# Patient Record
Sex: FEMALE | URBAN - METROPOLITAN AREA
[De-identification: names, ages, dates, MRNs, and addresses within clinical notes are randomized per-mention and may not be internally consistent; named-entity substitution may affect disease eponyms.]

---

## 2024-06-28 ENCOUNTER — LAB REQUISITION (OUTPATIENT)
Dept: LAB | Facility: HOSPITAL | Age: 19
End: 2024-06-28

## 2024-06-28 DIAGNOSIS — N39.0 URINARY TRACT INFECTION, SITE NOT SPECIFIED: ICD-10-CM

## 2024-06-28 PROCEDURE — 87086 URINE CULTURE/COLONY COUNT: CPT

## 2024-06-30 LAB — BACTERIA UR CULT: ABNORMAL

## 2024-09-12 ENCOUNTER — APPOINTMENT (OUTPATIENT)
Dept: URGENT CARE | Age: 19
End: 2024-09-12

## 2024-12-01 ENCOUNTER — OFFICE VISIT (OUTPATIENT)
Dept: URGENT CARE | Age: 19
End: 2024-12-01
Payer: MEDICAID

## 2024-12-01 VITALS
SYSTOLIC BLOOD PRESSURE: 137 MMHG | HEART RATE: 71 BPM | RESPIRATION RATE: 18 BRPM | OXYGEN SATURATION: 98 % | DIASTOLIC BLOOD PRESSURE: 80 MMHG

## 2024-12-01 DIAGNOSIS — R35.0 URINARY FREQUENCY: Primary | ICD-10-CM

## 2024-12-01 LAB
POC APPEARANCE, URINE: ABNORMAL
POC BILIRUBIN, URINE: NEGATIVE
POC BLOOD, URINE: NEGATIVE
POC COLOR, URINE: YELLOW
POC GLUCOSE, URINE: NEGATIVE MG/DL
POC KETONES, URINE: NEGATIVE MG/DL
POC LEUKOCYTES, URINE: NEGATIVE
POC NITRITE,URINE: NEGATIVE
POC PH, URINE: 6 PH
POC PROTEIN, URINE: NEGATIVE MG/DL
POC SPECIFIC GRAVITY, URINE: 1.01
POC UROBILINOGEN, URINE: 0.2 EU/DL
PREGNANCY TEST URINE, POC: NEGATIVE

## 2024-12-01 PROCEDURE — 87077 CULTURE AEROBIC IDENTIFY: CPT

## 2024-12-01 PROCEDURE — 87086 URINE CULTURE/COLONY COUNT: CPT

## 2024-12-01 ASSESSMENT — ENCOUNTER SYMPTOMS
CONSTITUTIONAL NEGATIVE: 1
FREQUENCY: 1
DYSURIA: 1

## 2024-12-01 NOTE — PROGRESS NOTES
Subjective   Patient ID: Dhaval Cotto is a 19 y.o. female. They present today with a chief complaint of burning when urinating and Urinary Frequency (X 1 day).    History of Present Illness  19-year-old female presents to clinic today with complaints of dysuria and frequency.  Patient states that this been ongoing for 1 day.  She denies any abdominal or pelvic pain.  Denies any flank pain.  Denies fevers.  Denies body aches or chills.  Denies vaginal discharge.      Urinary Frequency      Past Medical History  Allergies as of 12/01/2024    (No Known Allergies)       (Not in a hospital admission)       No past medical history on file.    No past surgical history on file.         Review of Systems  Review of Systems   Constitutional: Negative.    Genitourinary:  Positive for dysuria and frequency. Negative for pelvic pain.                                  Objective    Vitals:    12/01/24 1734   BP: 137/80   Pulse: 71   Resp: 18   SpO2: 98%     No LMP recorded.    Physical Exam  Constitutional:       Appearance: Normal appearance.   Abdominal:      General: Abdomen is flat.      Palpations: Abdomen is soft.      Tenderness: There is no right CVA tenderness or left CVA tenderness.   Neurological:      Mental Status: She is alert.         Procedures    Point of Care Test & Imaging Results from this visit  Results for orders placed or performed in visit on 12/01/24   POCT pregnancy, urine manually resulted   Result Value Ref Range    Preg Test, Ur Negative Negative   POCT UA Automated manually resulted   Result Value Ref Range    POC Color, Urine Yellow Straw, Yellow, Light-Yellow    POC Appearance, Urine Cloudy (A) Clear    POC Glucose, Urine NEGATIVE NEGATIVE mg/dl    POC Bilirubin, Urine NEGATIVE NEGATIVE    POC Ketones, Urine NEGATIVE NEGATIVE mg/dl    POC Specific Gravity, Urine 1.010 1.005 - 1.035    POC Blood, Urine NEGATIVE NEGATIVE    POC PH, Urine 6.0 No Reference Range Established PH    POC Protein, Urine  NEGATIVE NEGATIVE, 30 (1+) mg/dl    POC Urobilinogen, Urine 0.2 0.2, 1.0 EU/DL    Poc Nitrite, Urine NEGATIVE NEGATIVE    POC Leukocytes, Urine NEGATIVE NEGATIVE      No results found.    Diagnostic study results (if any) were reviewed by Irvin Meade PA-C.    Assessment/Plan   Allergies, medications, history, and pertinent labs/EKGs/Imaging reviewed by Irvin Meade PA-C.     Medical Decision Making  Patient pleasant and cooperative on examination.    Urinalysis did not show signs of an acute UTI.  There is no CVA tenderness.    We will still send urine for culture.  We will contact patient with any abnormal results.    Patient advised to increase fluid intake.  If symptoms persist please follow-up with urology or primary care.    Patient alert and oriented, no acute distress upon discharge.    Orders and Diagnoses  Diagnoses and all orders for this visit:  Urinary frequency  -     POCT pregnancy, urine manually resulted  -     POCT UA Automated manually resulted  -     Urine Culture      Medical Admin Record      Patient disposition: Home    Electronically signed by Irvin Meade PA-C  6:24 PM

## 2024-12-03 LAB — BACTERIA UR CULT: ABNORMAL

## 2024-12-03 RX ORDER — NITROFURANTOIN 25; 75 MG/1; MG/1
100 CAPSULE ORAL 2 TIMES DAILY
Qty: 14 CAPSULE | Refills: 0 | Status: SHIPPED | OUTPATIENT
Start: 2024-12-03 | End: 2024-12-10

## 2025-02-23 ENCOUNTER — OFFICE VISIT (OUTPATIENT)
Dept: URGENT CARE | Age: 20
End: 2025-02-23
Payer: MEDICAID

## 2025-02-23 VITALS
TEMPERATURE: 102 F | HEART RATE: 117 BPM | SYSTOLIC BLOOD PRESSURE: 136 MMHG | RESPIRATION RATE: 18 BRPM | DIASTOLIC BLOOD PRESSURE: 75 MMHG | OXYGEN SATURATION: 97 %

## 2025-02-23 DIAGNOSIS — Z20.822 SUSPECTED COVID-19 VIRUS INFECTION: ICD-10-CM

## 2025-02-23 DIAGNOSIS — R05.1 ACUTE COUGH: ICD-10-CM

## 2025-02-23 DIAGNOSIS — R68.89 FLU-LIKE SYMPTOMS: ICD-10-CM

## 2025-02-23 DIAGNOSIS — R09.81 NASAL CONGESTION: ICD-10-CM

## 2025-02-23 DIAGNOSIS — R50.9 FEVER, UNSPECIFIED FEVER CAUSE: Primary | ICD-10-CM

## 2025-02-23 LAB
POC RAPID INFLUENZA A: NEGATIVE
POC RAPID INFLUENZA B: NEGATIVE
POC SARS-COV-2 AG BINAX: NORMAL

## 2025-02-23 PROCEDURE — 87811 SARS-COV-2 COVID19 W/OPTIC: CPT | Performed by: NURSE PRACTITIONER

## 2025-02-23 PROCEDURE — 99214 OFFICE O/P EST MOD 30 MIN: CPT | Performed by: NURSE PRACTITIONER

## 2025-02-23 PROCEDURE — 87804 INFLUENZA ASSAY W/OPTIC: CPT | Performed by: NURSE PRACTITIONER

## 2025-02-23 RX ORDER — OSELTAMIVIR PHOSPHATE 75 MG/1
75 CAPSULE ORAL EVERY 12 HOURS
Qty: 10 CAPSULE | Refills: 0 | Status: SHIPPED | OUTPATIENT
Start: 2025-02-23 | End: 2025-02-28

## 2025-02-23 RX ORDER — ACETAMINOPHEN 500 MG
1000 TABLET ORAL ONCE
Status: COMPLETED | OUTPATIENT
Start: 2025-02-23 | End: 2025-02-23

## 2025-02-23 RX ADMIN — Medication 1000 MG: at 14:34

## 2025-02-23 ASSESSMENT — ENCOUNTER SYMPTOMS
HEMOPTYSIS: 0
CHILLS: 0
HEADACHES: 0
COUGH: 1
SORE THROAT: 0
SWEATS: 0
RHINORRHEA: 0
SHORTNESS OF BREATH: 0
HEARTBURN: 0
WEIGHT LOSS: 0
FEVER: 1
WHEEZING: 0
MYALGIAS: 0

## 2025-02-23 NOTE — PROGRESS NOTES
Subjective   Patient ID: Dhaval Cotto is a 20 y.o. female. They present today with a chief complaint of fever, cough, body aches.    History of Present Illness    History provided by:  Patient   used: No    Cough  This is a new problem. The current episode started yesterday. The problem has been gradually worsening. The problem occurs hourly. The cough is Non-productive. Associated symptoms include a fever. Pertinent negatives include no chest pain, chills, ear congestion, ear pain, headaches, heartburn, hemoptysis, myalgias, nasal congestion, postnasal drip, rash, rhinorrhea, sore throat, shortness of breath, sweats, weight loss or wheezing. Associated symptoms comments: Myalgias, fever, cough. Nothing aggravates the symptoms. She has tried nothing for the symptoms. The treatment provided no relief.       Past Medical History  Allergies as of 02/23/2025    (No Known Allergies)       (Not in a hospital admission)       No past medical history on file.    No past surgical history on file.         Review of Systems  Review of Systems   Constitutional:  Positive for fever. Negative for chills and weight loss.   HENT:  Negative for ear pain, postnasal drip, rhinorrhea and sore throat.    Respiratory:  Positive for cough. Negative for hemoptysis, shortness of breath and wheezing.    Cardiovascular:  Negative for chest pain.   Gastrointestinal:  Negative for heartburn.   Musculoskeletal:  Negative for myalgias.   Skin:  Negative for rash.   Neurological:  Negative for headaches.          Objective    Vitals:    02/23/25 1423   BP: 136/75   Pulse: (!) 117   Resp: 18   Temp: (!) 38.9 °C (102 °F)   SpO2: 97%     No LMP recorded.    Physical Exam  Vitals and nursing note reviewed.   Constitutional:       Appearance: Normal appearance.   HENT:      Head: Normocephalic and atraumatic.      Right Ear: Hearing, tympanic membrane, ear canal and external ear normal.      Left Ear: Hearing, tympanic membrane,  ear canal and external ear normal.      Nose: Mucosal edema and congestion present. No nasal deformity, septal deviation, signs of injury, laceration, nasal tenderness or rhinorrhea.      Right Sinus: No maxillary sinus tenderness or frontal sinus tenderness.      Left Sinus: No maxillary sinus tenderness or frontal sinus tenderness.      Mouth/Throat:      Lips: Pink.      Mouth: Mucous membranes are moist.      Pharynx: Oropharynx is clear. Uvula midline.      Tonsils: No tonsillar exudate or tonsillar abscesses.   Cardiovascular:      Rate and Rhythm: Regular rhythm. Tachycardia present.      Heart sounds: Normal heart sounds.   Pulmonary:      Effort: Pulmonary effort is normal.      Breath sounds: Normal breath sounds and air entry.   Musculoskeletal:      Cervical back: Normal range of motion and neck supple.   Lymphadenopathy:      Cervical: No cervical adenopathy.   Neurological:      Mental Status: She is alert.   Psychiatric:         Mood and Affect: Mood normal.         Behavior: Behavior normal.         Procedures    Point of Care Test & Imaging Results from this visit  Results for orders placed or performed in visit on 02/23/25   POCT BinaxNOW Covid-19 Ag Card manually resulted   Result Value Ref Range    POC ROMEO-COV-2 AG  Presumptive negative test for SARS-CoV-2 (no antigen detected)     Presumptive negative test for SARS-CoV-2 (no antigen detected)   POCT Influenza A/B manually resulted   Result Value Ref Range    POC Rapid Influenza A Negative Negative    POC Rapid Influenza B Negative Negative      No results found.    Diagnostic study results (if any) were reviewed by ISABELLA Babrer.    Assessment/Plan   Allergies, medications, history, and pertinent labs/EKGs/Imaging reviewed by ISABELLA Barber.     Medical Decision Making  Negative covid and flu tests. However, pt had flu-like symptoms and potential exposure to flu at work and on campus as influenza has been prevalent in the  [facility].  Oral acetaminophen was administered during the visit.  Treatment and supportive care of influenza discussed. Adverse effects of Tamiflu discussed. Take Tamiflu as instructed. Symptoms should improve in 3-4 days.  Increase fluid intake and rest as needed.  Take Tylenol and/or ibuprofen as needed for aches and pain and/or fever.  Return or follow-up with primary care provider if symptoms did not improve and/or pt developed purulent nasal discharge, worsening cough, fever, worsening sore throat, ear pain, sinus pain and headaches.  Call 911 or go to the nearest emergency room if symptoms became severe such as severe pain, shortness of breath, chest tight ness.  Advised to take acetaminophen and/or ibuprofen to control fever, aches and/or pain. Recheck heart rate at home.  If your heart rates above 100 beats per minute persist, to follow up with your primary care provider.  Patient verbalized understanding of the instructions and left in stable condition.      Orders and Diagnoses  Diagnoses and all orders for this visit:  Fever, unspecified fever cause  -     acetaminophen (Tylenol) tablet 1,000 mg  -     oseltamivir (Tamiflu) 75 mg capsule; Take 1 capsule (75 mg) by mouth every 12 hours for 5 days.  Flu-like symptoms  -     POCT Influenza A/B manually resulted  -     oseltamivir (Tamiflu) 75 mg capsule; Take 1 capsule (75 mg) by mouth every 12 hours for 5 days.  Suspected COVID-19 virus infection  -     POCT BinaxNOW Covid-19 Ag Card manually resulted  Acute cough  -     oseltamivir (Tamiflu) 75 mg capsule; Take 1 capsule (75 mg) by mouth every 12 hours for 5 days.  Nasal congestion  -     oseltamivir (Tamiflu) 75 mg capsule; Take 1 capsule (75 mg) by mouth every 12 hours for 5 days.      Medical Admin Record  Administrations This Visit       acetaminophen (Tylenol) tablet 1,000 mg       Admin Date  02/23/2025 Action  Given Dose  1,000 mg Route  oral Documented By  Rena Cowart RN                     Patient disposition: Home    Electronically signed by ISABELLA Barber  2:37 PM

## 2025-02-23 NOTE — LETTER
February 25, 2025     Patient: Dhaval Cotto   YOB: 2005   Date of Visit: 2/23/2025       To Whom it May Concern:    Dhaval Cotto was seen in my clinic on 2/23/2025. She may return to school on 2/24/25 .    If you have any questions or concerns, please don't hesitate to call.         Sincerely,          BETO Barber-CNP        CC: No Recipients